# Patient Record
(demographics unavailable — no encounter records)

---

## 2024-11-05 NOTE — ASSESSMENT
[FreeTextEntry1] : . 56 y/o F with HTN, HLD, non-obstructive CAD, HFrEF, CVA?, DM, cirrhosis/fibrosis, obesity, ESRD on HD, Vit D deficiency, GERD, fibromyalgia, ovarian cancer s/p hysterectomy + BL oophorectomy + chemo, presenting for f/u.  # Posterior neck lesion; suspect lipoma - f/u US  # SC lump RUQ; resolved  # nodular thickening of the left adrenal gland - f/u with endo  # HTN; controlled - c/w amlodipine 5mg po daily, carvedilol 3.125mg unclear frequency  # HLD - 7/2024 LDL at goal, TG high- will f/u with endo - c/w atorvastatin 40mg po daily  # non-obstructive CAD # HFrEF - following with OSH cardio; will send in records - c/w carvedilol 3.125 unclear frequency, plavix 75mg po daily, bumex as per cardio  # CVA? # intermittent headaches - past imaging without evidence of CVA - f/u neuro  # DM # obesity - following with endo, c/w mounjaro and insulin regimen as per endo - A1c and lipids as per cardio - UTD  optho, no retinopathy - due podiatry  # cirrhosis/fibrosis - f/u hepatology  # ESRD on HD - c/w current regimen as per nephro - f/u nephro regarding headaches at HD - cbc/cmp with nephro  # Vit D deficiency - c/w vit D3 50K IU weekly to complete course followed by 1000 IU daily  #GERD; controlled - c/w omeprazole 40mg po daily 30 min before breakfast  # Fibromyalgia # polyarthralgia - follows with OSH rheum, c/w current regimen as per rheum - reviewed elevated ESR/CRP - rheum and pain management eval given multiple med allergies  # Ovarian cancer s/p hysterectomy + BL oophorectomy + chemo; in remission - following with OSH GYN, does not recall doctors name  # HCM - mammo  - DEXA - never had scope; GI f/u (scopes cancelled due to hyperkalemia) - flu vaccine today  f/u 4 months.

## 2024-11-05 NOTE — HISTORY OF PRESENT ILLNESS
[de-identified] : 56 y/o F with HTN, HLD, non-obstructive CAD, HFrEF, CVA?, DM, cirrhosis/fibrosis, obesity, ESRD on HD, Vit D deficiency, GERD, fibromyalgia, ovarian cancer s/p hysterectomy + BL oophorectomy + chemo, presenting for f/u. Fibromyalgia and polyarthralgia worse Doing well on Mounjaro + chronic headaches at HD stable + lump on back of neck, US not completed + subcutaneous lump RUQ resolved

## 2024-12-19 NOTE — ASSESSMENT
[FreeTextEntry1] : .  56 y/o F with HTN, HLD, non-obstructive CAD, HFrEF, CVA?, DM, cirrhosis/fibrosis, obesity, ESRD on HD, Vit D deficiency, GERD, fibromyalgia, ovarian cancer s/p hysterectomy + BL oophorectomy + chemo, presenting for pre-op clearance for L cataract surgery.  # HTN; controlled? due for HD tomorrow - c/w amlodipine 5mg po daily, carvedilol 3.125mg unclear frequency  # HLD - 7/2024 LDL at goal, TG high- will f/u with endo - c/w atorvastatin 40mg po daily  # non-obstructive CAD # HFrEF - f/u OSH cardio - c/w carvedilol 3.125 unclear frequency, plavix 75mg po daily, bumex as per cardio  # pre-op clearance - reviewed EKG; medically optimized for cataract procedure  f/u 3 months

## 2024-12-19 NOTE — HISTORY OF PRESENT ILLNESS
[de-identified] : 58 y/o F with HTN, HLD, non-obstructive CAD, HFrEF, CVA?, DM, cirrhosis/fibrosis, obesity, ESRD on HD, Vit D deficiency, GERD, fibromyalgia, ovarian cancer s/p hysterectomy + BL oophorectomy + chemo, presenting for pre-op clearance for L cataract surgery. chronic headaches stable, didnt see neuro yet

## 2025-01-26 NOTE — PHYSICAL EXAM
[Alert] : alert [Well Nourished] : well nourished [Obese] : obese [No Acute Distress] : no acute distress [Well Developed] : well developed [Normal Sclera/Conjunctiva] : normal sclera/conjunctiva [Normal Oropharynx] : the oropharynx was normal [No LAD] : no lymphadenopathy [Supple] : the neck was supple [Thyroid Not Enlarged] : the thyroid was not enlarged [No Thyroid Nodules] : no palpable thyroid nodules [No Respiratory Distress] : no respiratory distress [Normal Rate and Effort] : normal respiratory rate and effort [Clear to Auscultation] : lungs were clear to auscultation bilaterally [Normal S1, S2] : normal S1 and S2 [Normal Rate] : heart rate was normal [Regular Rhythm] : with a regular rhythm [No Edema] : no peripheral edema [Pedal Pulses Normal] : the pedal pulses are present [Normal Bowel Sounds] : normal bowel sounds [Not Tender] : non-tender [Not Distended] : not distended [Soft] : abdomen soft [Normal Anterior Cervical Nodes] : no anterior cervical lymphadenopathy [No Stigmata of Cushings Syndrome] : no stigmata of Cushings Syndrome [Normal Gait] : normal gait [No Clubbing, Cyanosis] : no clubbing  or cyanosis of the fingernails [Normal Strength/Tone] : muscle strength and tone were normal [No Rash] : no rash [Abdominal Striae] : abdominal striae present [Acanthosis Nigricans] : acanthosis nigricans present [No Sensory Deficits] : the sensory exam was normal to light touch and pinprick [Normal Reflexes] : deep tendon reflexes were 2+ and symmetric [No Tremors] : no tremors [Oriented x3] : oriented to person, place, and time [Foot Ulcers] : no foot ulcers [Acne] : no acne [Hirsutism] : no hirsutism [de-identified] : Morbidly obese [de-identified] : Umblical hernia [de-identified] : Skin hyperpigmentation on face

## 2025-01-26 NOTE — HISTORY OF PRESENT ILLNESS
[FreeTextEntry1] : 57 year old Female with PMH of ovarian cancer s/p hystrectomy and oopherectomy, HTN, T2DM, ESRD on HD, morbid obesity came for type 2 diabetes follow up  Diabetes Hx: Diabetes diagnosed for > 20 years Medication regimen: Lantus 22 units at bedtime, Stopped Humalog 4-5 units before meals, Mounjaro 7.5 mg weekly, stopped pregabalin, takes atorvastatin 40 mg daily. Losartan 50 MG daily, vascepa1 gm daily. Patient did get the medications for some reason, now restarted taking medications.  Recent fingersticks: Did not get the freestyle cortes yet. Using manual glucometer Checks sugars ranges from 90. During the day its 140s, denies that BS are >150s.  Hypoglycemic events: Denies Diet: Has changed her diet, only eats spoon of rice, and pasta Opthalmology appointment: had a cataract surgery 6 months ago. Denies retinopathy. Has to make an appointment Peripheral Neuropathy: Yes, takes pregabalin, sees podiatry every 6 months. CVD: Denies Patient is on transplant list for renal transplant, however she was told to lose weight to be eligible for transplant. Patient is interested in Bariatric surgery, however she is at high risk and currently is not eligible for bariatric sx.  Patient is now losing weight after she has switched from ozempic to mounjaro. Has lost 50 lbs after starting GLP-1 agonist. Has an appointment with renal transplant team for reevaluation.

## 2025-01-26 NOTE — PHYSICAL EXAM
[Alert] : alert [Well Nourished] : well nourished [Obese] : obese [No Acute Distress] : no acute distress [Well Developed] : well developed [Normal Sclera/Conjunctiva] : normal sclera/conjunctiva [Normal Oropharynx] : the oropharynx was normal [No LAD] : no lymphadenopathy [Supple] : the neck was supple [Thyroid Not Enlarged] : the thyroid was not enlarged [No Thyroid Nodules] : no palpable thyroid nodules [No Respiratory Distress] : no respiratory distress [Normal Rate and Effort] : normal respiratory rate and effort [Clear to Auscultation] : lungs were clear to auscultation bilaterally [Normal S1, S2] : normal S1 and S2 [Normal Rate] : heart rate was normal [Regular Rhythm] : with a regular rhythm [No Edema] : no peripheral edema [Pedal Pulses Normal] : the pedal pulses are present [Normal Bowel Sounds] : normal bowel sounds [Not Tender] : non-tender [Not Distended] : not distended [Soft] : abdomen soft [Normal Anterior Cervical Nodes] : no anterior cervical lymphadenopathy [No Stigmata of Cushings Syndrome] : no stigmata of Cushings Syndrome [Normal Gait] : normal gait [No Clubbing, Cyanosis] : no clubbing  or cyanosis of the fingernails [Normal Strength/Tone] : muscle strength and tone were normal [No Rash] : no rash [Abdominal Striae] : abdominal striae present [Acanthosis Nigricans] : acanthosis nigricans present [No Sensory Deficits] : the sensory exam was normal to light touch and pinprick [Normal Reflexes] : deep tendon reflexes were 2+ and symmetric [No Tremors] : no tremors [Oriented x3] : oriented to person, place, and time [Foot Ulcers] : no foot ulcers [Acne] : no acne [Hirsutism] : no hirsutism [de-identified] : Morbidly obese [de-identified] : Umblical hernia [de-identified] : Skin hyperpigmentation on face

## 2025-01-26 NOTE — REVIEW OF SYSTEMS
[Fatigue] : fatigue [Pain/Numbness of Digits] : pain/numbness of digits [Recent Weight Gain (___ Lbs)] : no recent weight gain [Blurred Vision] : no blurred vision [Dysphagia] : no dysphagia [Neck Pain] : no neck pain [Chest Pain] : no chest pain [Palpitations] : no palpitations [Lower Ext Edema] : no lower extremity edema [Shortness Of Breath] : no shortness of breath [SOB on Exertion] : no shortness of breath on exertion [Nausea] : no nausea [Constipation] : no constipation [Abdominal Pain] : no abdominal pain [Vomiting] : no vomiting [Diarrhea] : no diarrhea [Polyuria] : no polyuria [Headaches] : no headaches [Dizziness] : no dizziness [Tremors] : no tremors [Polydipsia] : no polydipsia [Cold Intolerance] : no cold intolerance

## 2025-02-13 NOTE — PHYSICAL EXAM
[No Acute Distress] : no acute distress [Sclera Anicteric] : sclera anicteric [Clear to Auscultation] : clear to auscultation [Breathing Comfortably on RA] : breathing comfortably on room air [Normal Rate] : normal rate [Regular Rhythm] : regular rhythm [Soft] : soft [Non-tender] : non-tender [Scars] : scars [In Left Forearm] : fistula/graft in left forearm [] : right dorsalis pedis palpable [Normal] : normal [FreeTextEntry1] : No dentures. missing teeth from chemo [TextBox_25] : Obese. Midline scar. lower suprapubic incisional hernia, No masses [TextBox_34] : Trace edema b/l LE, no ulcers.  [TextBox_86] : No inguinal lymphadenopathy

## 2025-02-13 NOTE — ASSESSMENT
[Fair candidate] : a fair candidate. We should proceed with our protocol for evaluation for kidney transplantation. [FreeTextEntry1] : long-standing DM, ?heart failure in the past; Multiple abdominal surgeries Will need cardiology eval CT chest, abd/pel Hepatology follow-up for fatty liver and inconclusive fibroscan Neuro eval for prior CVA/TIA Routine transplant workup

## 2025-02-13 NOTE — REVIEW OF SYSTEMS
[Fever] : no fever [Sclera anicteric] : sclera anicteric [Wears glasses] : wears glasses [Chest Pain] : no chest pain [SOB] : no shortness of breath [Abdominal Pain] : no abdominal pain [Dysuria] : no dysuria [Hematuria] : no hematuria

## 2025-02-13 NOTE — HISTORY OF PRESENT ILLNESS
[Previous Kidney Transplant] : no previous kidney transplant [Cardiac History] : cardiac history [Blood Transfusion] : prior blood transfusion [Hx of DVT/Thrombosis/Miscarriage] : no history of dvt/thrombosis/miscarriage [Diabetes] : diabetes [Retinopathy] : no retinopathy [Neuropathy] : no neuropathy [Insulin] : insulin treatment [Not Working] : Not working [TextBox_16] : 8/2022 [TextBox_20] : 2022 [TextBox_24] : 3 years [TextBox_28] : 20 years [TextBox_30] : 10 blocks [TextBox_39] : 2015 [FreeTextEntry3] : on disability [de-identified] : 57F with ESRD on HD for evaluation for kidney transplant. Patient has history of ovarian CA 20 years ago, s/p BRITTANY-BSO and 9 cycles of chemotherapy in [facility]. She had CVA/TIA  with occlusion of vertebral artery which reportedly resolved spontaneously in 2022, and then she developed renal failure requiring HD.  Hgb A1c 5.9   HD - [facility] [place] [facility] - No  PMH: HTN, DM, ESRD on HD, Ovarian CA (completed 9 cycles of chemo in 2005/6), ?HFpEF; Fibromyalgia PSH: 18 abdominal surgeries - BRITTANY-BSO 2005; hernia repair x2 with mesh; repair EC fistula with SBR; LUE AVF; ectopic pregnancy; Cataract surgery Meds: Lantus 18u, Mounjaro 10, Atorvastatin, Losartan, Amlodipine, Bumetanide, Carvedilol Allergies: ASA, Ibuprofen, Tylenol Social: Denies tob, etoh, drugs Lives with parents (82,82yo) Has one daughter, 31yo FMH: Paternal aunts with Breast CA No renal disease    ROS:  Cardiac - no MI, CAD. ?HFpEF Pulmonary- no h/o COPD, Asthma Infections- no h/o TB, HIV, Hepatitis.  Heme- no h/o malignancy or bleeding disorders. No DVT/PE Neuro- +CVA x2, no deficit Sensitization events- + previous blood transfusions; No previous transplant No infections or hospitalizations in the last few months  - No UTI or Kidney stones. Makes normal amount of urine with diuretics GI - No melena or blood per rectum. Last colonoscopy was a few years ago Mammogram last week negative; PAP 2 years ago negative

## 2025-02-13 NOTE — REASON FOR VISIT
[Initial] : an initial visit for [End-Stage Renal Disease] : end-stage renal disease [Kidney Transplant Evaluation] : kidney transplant evaluation [FreeTextEntry2] : Dr Pedro

## 2025-02-14 NOTE — PHYSICAL EXAM
[General Appearance - Alert] : alert [General Appearance - In No Acute Distress] : in no acute distress [Sclera] : the sclera and conjunctiva were normal [PERRL With Normal Accommodation] : pupils were equal in size, round, and reactive to light [Extraocular Movements] : extraocular movements were intact [Outer Ear] : the ears and nose were normal in appearance [Oropharynx] : the oropharynx was normal [Neck Appearance] : the appearance of the neck was normal [Neck Cervical Mass (___cm)] : no neck mass was observed [Jugular Venous Distention Increased] : there was no jugular-venous distention [Thyroid Diffuse Enlargement] : the thyroid was not enlarged [Thyroid Nodule] : there were no palpable thyroid nodules [Auscultation Breath Sounds / Voice Sounds] : lungs were clear to auscultation bilaterally [Heart Rate And Rhythm] : heart rate was normal and rhythm regular [Heart Sounds] : normal S1 and S2 [Heart Sounds Gallop] : no gallops [Murmurs] : no murmurs [Heart Sounds Pericardial Friction Rub] : no pericardial rub [Full Pulse] : the pedal pulses are present [Edema] : there was no peripheral edema [Bowel Sounds] : normal bowel sounds [Abdomen Soft] : soft [Abdomen Tenderness] : non-tender [Abdomen Mass (___ Cm)] : no abdominal mass palpated [Skin Color & Pigmentation] : normal skin color and pigmentation [Skin Turgor] : normal skin turgor [] : no rash [Normal] : normal [Deep Tendon Reflexes (DTR)] : deep tendon reflexes were 2+ and symmetric [Sensation] : the sensory exam was normal to light touch and pinprick [No Focal Deficits] : no focal deficits

## 2025-02-14 NOTE — HISTORY OF PRESENT ILLNESS
[TextBox_42] : MALLY KAMINSKI is a 57 year old female who presents for kidney transplant evaluation.  Cause of ESRD : Long standing h/o DM ( for over 20 years)  Nephrologist:  Dr Pedro  on IHD since 2022 . HD - [facility], [facility] - No at present.   PMH includes- HTN DM h/o Ovarian CA over 20 years ago (completed 9 cycles of chemo in 2005/6),  h/o CVA X2  in 2022 ( 1 month apart) with no residual deficits.  In 5/2022 she had a CVA  with right sided weakness and numbness . And in 6/2022 she had a subacute/chronic L hemistroke with chronic headache and left sided numbness and tingling.  h/o ?HFpEF no MI, CAD.  no h/o COPD, Asthma no h/o TB, HIV, Hepatitis. No UTI or Kidney stones. Makes normal amount of urine with diuretics Sensitization events- has had  + previous blood transfusions; No previous transplant  PSH: 18 abdominal surgeries - BRITTANY-BSO 2005; hernia repair x2 with mesh; repair EC fistula with SBR; LUE AVF; ectopic pregnancy; Cataract surgery Meds: Lantus 18u, Mounjaro 10, Atorvastatin, Losartan, Amlodipine, Bumetanide, Carvedilol Allergies: ASA, Ibuprofen, Tylenol Social: Denies tob, etoh, drugs. Lives with parents (82,82yo). Has one daughter, 31yo FMH: no family h/o kidney disease.  Paternal aunts with Breast CA.

## 2025-03-04 NOTE — REASON FOR VISIT
[Other: ____] : [unfilled] [FreeTextEntry1] : born in [facility] and living in the  for more than 15 years she had CVA x 2 (2 years ago, headache and taken to the hospital, had a clot in the neck that passed through, at that time found out her kidneys were failure) one month later, right sided weakness chronic hypertension, dyslipidemia, diabetes mellitus (A1c 5.2%) ovarian cancer (6 laparoscopic surgeries, tumor removal, ectopic pregnancy, ovarian cancer (BRITTANY, chemo treatment 9 cycles in 2005), right eye surgery, hernia surgery, fistula fixed.   DM2 (previously on insulin, now on Mounjouro, diagnosed __ ago) ESRD on HD, [facility], M-W-F left forearm AV fistula has had blood transfusions in [facility].  Has fibromyalgia, osteoporosis, obesity BMI 45.  Her parents are alive, 82 and 83.  She has one brother and three sisters.  She has one daughter, age 32.  She is not working, she quit after her renal disease. Previously she worked as an  at a clothes department.   She does not exercise. She can walk 4 blocks.  She has a cardiologist.

## 2025-03-04 NOTE — REASON FOR VISIT
[Other: ____] : [unfilled] [FreeTextEntry1] : born in [facility] and living in the  for more than 15 years she had CVA x 2 (2 years ago, headache and taken to the hospital, had a clot in the neck that passed through, at that time found out her kidneys were failure) one month later, right sided weakness chronic hypertension, dyslipidemia, diabetes mellitus (A1c 5.2%) ovarian cancer (6 laparoscopic surgeries, tumor removal, ectopic pregnancy, ovarian cancer (BRITTANY, chemo treatment 9 cycles in 2005), right eye surgery, hernia surgery, fistula fixed.   DM2 (previously on insulin, now on Mounjouro, diagnosed __ ago) ESRD on HD, [facility], M-W-F left forearm AV fistula has had blood transfusions in [facility].  Has fibromyalgia, osteoporosis, obesity BMI 45.  Her parents are alive, 82 and 83.  She has one brother and three sisters.  She has one daughter, age 32.  She is not working, she quit after her renal disease. Previously she worked as an  at a clothes department.   She does not exercise. She can walk 4 blocks.  She has a cardiologist.

## 2025-03-04 NOTE — REASON FOR VISIT
[Other: ____] : [unfilled] [FreeTextEntry1] : born in [facility] and living in the  for more than 15 years she had CVA x 2 (2 years ago, headache and taken to the hospital, had a clot in the neck that passed through, at that time found out her kidneys were failure) one month later, right sided weakness chronic hypertension, dyslipidemia, diabetes mellitus (A1c 5.2%) ovarian cancer (6 laparoscopic surgeries, tumor removal, ectopic pregnancy, ovarian cancer (BRITTANY, chemo treatment 9 cycles in 2005), right eye surgery, hernia surgery, fistula fixed.   DM2 (previously on insulin, now on Mounjouro, diagnosed __ ago) ESRD on HD, [facility] on [facility], M-W-F left forearm AV fistula has had blood transfusions in [facility].  Has fibromyalgia, osteoporosis, obesity BMI 45.  Her parents are alive, 82 and 83.  She has one brother and three sisters.  She has one daughter, age 32.  She is not working, she quit after her renal disease. Previously she worked as an  at a clothes department.   She does not exercise. She can walk 4 blocks.  She has a cardiologist.

## 2025-03-08 NOTE — DISCUSSION/SUMMARY
[EKG obtained to assist in diagnosis and management of assessed problem(s)] : EKG obtained to assist in diagnosis and management of assessed problem(s) [FreeTextEntry1] : 57-year-old who presents today for pretransplant cardiac evaluation prior to potential renal transplant with complex history as above.   She will schedule an echocardiogram for structural heart evaluation.  A nuclear stress test will evaluate for any evidence of inducible ischemia.   Follow up pending results.

## 2025-03-08 NOTE — HISTORY OF PRESENT ILLNESS
[FreeTextEntry1] : Eva Corona presents today for pretransplant cardiac evaluation prior to potential renal transplant.   She is a 57-year-old, born in [facility] and living in the  for more than 15 years, with known cardiac risk factors of prior CVA x 2 (2022, one month apart, 5/2022 CVA with right sided weakness and numbness and 6/2022 subacute/chronic left hemistroke with chronic headache and left sided numbness and tingling) chronic hypertension, dyslipidemia, diabetes mellitus (A1c 5.2%) and ESRD (on hemodialysis since 2022, M-W-F, [facility]).   She has a history of ovarian cancer (treated with 9 cycles of chemotherapy, 2005). She also has fibromyalgia, osteoporosis, obesity with BMI 45.  Surgical history includes 6 laparoscopic surgeries, tumor removal, ectopic pregnancy, BRITTANY-BSO, right eye surgery, hernia surgery with mesh, and enterocutaneous fistula repair, AV fistula creation.    Her parents are alive, 82 and 83. She has one brother and three sisters.  She has one daughter, age 32.  She is not working, she quit after her renal disease. Previously she worked as an  at a clothing department.   She does not exercise. She can walk 4 blocks and climb stair slowly. No chest discomfort, shortness of breath, palpitations, lightheadedness or syncope.

## 2025-03-08 NOTE — END OF VISIT
[FreeTextEntry3] : I, Carrillo Ellsworth MD, personally performed the evaluation and management (E/M) services for this new patient. That E/M includes conducting the clinically appropriate initial history &/or exam, assessing all conditions, and establishing the plan of care. Today, Cleopatra Jaramillo NP was here to observe my evaluation and management service for this patient & follow plan of care established by me going forward. [Time Spent: ___ minutes] : I have spent [unfilled] minutes of time on the encounter which excludes teaching and separately reported services.

## 2025-03-08 NOTE — HISTORY OF PRESENT ILLNESS
[FreeTextEntry1] : Eva Corona presents today for pretransplant cardiac evaluation prior to potential renal transplant.   She is a 57-year-old, born in [facility] and living in the  for more than 15 years, with known cardiac risk factors of prior CVA x 2 (2022, one month apart, 5/2022 CVA with right sided weakness and numbness and 6/2022 subacute/chronic left hemistroke with chronic headache and left sided numbness and tingling) chronic hypertension, dyslipidemia, diabetes mellitus (A1c 5.2%) and ESRD (on hemodialysis since 2022, M-W-F, [facility] on [facility]).   She has a history of ovarian cancer (treated with 9 cycles of chemotherapy, 2005). She also has fibromyalgia, osteoporosis, obesity with BMI 45.  Surgical history includes 6 laparoscopic surgeries, tumor removal, ectopic pregnancy, BRITTANY-BSO, right eye surgery, hernia surgery with mesh, and enterocutaneous fistula repair, AV fistula creation.    Her parents are alive, 82 and 83. She has one brother and three sisters.  She has one daughter, age 32.  She is not working, she quit after her renal disease. Previously she worked as an  at a clothing department.   She does not exercise. She can walk 4 blocks and climb stair slowly. No chest discomfort, shortness of breath, palpitations, lightheadedness or syncope.

## 2025-03-08 NOTE — CARDIOLOGY SUMMARY
[de-identified] : 3/4/2025 - sinus rhythm at 80 bpm, nonspecific T-abnormality, poor R-wave progression

## 2025-03-08 NOTE — CARDIOLOGY SUMMARY
[de-identified] : 3/4/2025 - sinus rhythm at 80 bpm, nonspecific T-abnormality, poor R-wave progression

## 2025-03-08 NOTE — CARDIOLOGY SUMMARY
[de-identified] : 3/4/2025 - sinus rhythm at 80 bpm, nonspecific T-abnormality, poor R-wave progression

## 2025-03-08 NOTE — HISTORY OF PRESENT ILLNESS
[FreeTextEntry1] : Eva Corona presents today for pretransplant cardiac evaluation prior to potential renal transplant.   She is a 57-year-old, born in [facility] and living in the  for more than 15 years, with known cardiac risk factors of prior CVA x 2 (2022, one month apart, 5/2022 CVA with right sided weakness and numbness and 6/2022 subacute/chronic left hemistroke with chronic headache and left sided numbness and tingling) chronic hypertension, dyslipidemia, diabetes mellitus (A1c 5.2%) and ESRD (on hemodialysis since 2022, M-W-F, [facility] on [facility]).   She has a history of ovarian cancer (treated with 9 cycles of chemotherapy, 2005). She also has fibromyalgia, osteoporosis, obesity with BMI 45.  Surgical history includes 6 laparoscopic surgeries, tumor removal, ectopic pregnancy, BRITTANY-BSO, right eye surgery, hernia surgery with mesh, and enterocutaneous fistula repair, AV fistula creation.    Her parents are alive, 82 and 83. She has one brother and three sisters.  She has one daughter, age 32.  She is not working, she quit after her renal disease. Previously she worked as an  at a clothing department.   She does not exercise. She can walk 4 blocks and climb stair slowly. No chest discomfort, shortness of breath, palpitations, lightheadedness or syncope.

## 2025-03-26 NOTE — HISTORY OF PRESENT ILLNESS
[FreeTextEntry1] : Mar 26 2025  4:00PM --. check in 4.26  Underlying Disease(s) Requiring Transplant:  Long standing h/o DM ( for over 20 years) on IHD since 2022. HD - [facility] - No at present. --------------------------------------------------------------------------------- Ms. MALLY KAMINSKI  is a 57 year  year-old F   undergoing evaluation for transplantation. Infectious diseases (ID) has been consulted for assessment of infection risks and to render an opinion as to whether or not they are a suitable candidate for transplantation from the infectious diseases standpoint and for positive Syphilis serology and HIV serology being indeterminate  Makes normal amount of urine with diuretics but on HD through AVF  PSH: 18 abdominal surgeries - BRITTANY-BSO 2005; hernia repair x2 with mesh; repair EC fistula with SBR; LUE AVF h/o Ovarian CA over 20 years ago (completed 9 cycles of chemo in 2005/6), h/o CVA X2 in 2022 ( 1 month apart) with no residual deficits. In 5/2022 she had a CVA with right sided weakness and numbness. And in 6/2022 she had a subacute/chronic L hemistroke with chronic headache and left sided numbness   Has not been sexually active sicne her ovarian cancer She has enver been treated for syphilis and denies vaginal ulcers or discharge, no other known or treated STD- no transfusions recently She states she received "purified liver transfusions" In [facility] for low iron   -------------------------------------------------------- Past/current ID issues (taken from patient report and records)  history of hospitalization due to infection: NO History of bacteremia, sepsis:NO History of endocarditis: NO History of MDRO: NO History of pneumonia:NO History of Flu/COVID 19: NO History of recurrent UTIs:NO History of infectious diarrhea, Cdiff: NO History of dental infection: NO History of meningitis:NO History of sinusitis:NO Childhood history of recurrent pharyngitis/strep throat; ear infections, sinusitis: NO History of HPV/high grade dysplasia: v History of STD: yes History of Shingles: NO History of VV Candidiasis:NO History of FUO:  History of OM, skin soft tissue infections: hadankle cellulitis vs soft tissue/? infection at [facility] 1 year ago -- History of tonsillectomy/appendectomy/splenectomy: History of metal hardware (e.g:PPM/defibrillator, prosthesis, Pins/needles): ---------------------------------------------------------------------------------------------------------------------------------- Childhood illnesses Chickenpox: yes  Measles, Rubella, Scarlet fever, RH fever: NO  --------------------------------------------------------------------------------------------------------------------------------- Exposure/Travel History:    Current Residence (Born and raised): Born in [place] [facility] Lived in other states:  Travel within  (including [facility] or [facility]): no Foreign travel history:   no 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 Never been homeless.   work in Healthcare setting:NO ---------------------------------------------------------------------------------------------------------------------------- Animal Exposure history: 	Domestic :  has a perroket 	Wildlife : 	Livestock animals: 	Birds :NO 	Fishes :NO 	other:NO  ----------------------------------------------------------------------------------------------------------------------------- Dietary Habits:  Reviewed risks of consuming and advised to avoid: 	Raw animal or dairy products -unpasteurized milk /cheese. 	Raw seafood -sushi 	Raw eggs - 	Raw or undercooked meat/poultry -   	Unpasteurized milk products  -   	Home made sausage - no      Unwashed vegetables -  Reviewed food safety as it relates to infection risks in the setting of transplantation in the immunocompromised host.   Reviewed CDC guidelines for the prevention of Listeria in the immunocompromised host.      Environment: 	Residence water supply: city/island 	Reviewed of occupational and recreational exposure risks as they relate to infection in the setting of transplantation.  --------------------------------------------------------------------- Prior or current immunosuppressive therapies:  ---------------------------------------------------------------------- Immunization History: ----- Childhood immunizations:    ----- Vaccine:                     Y/N;  Date vaccine administered   	Tdap/Td            	Prevnar		 	Pneumovax 	 	Hepatitis A	 	Hepatitis B	 	Influenza	 	Shingles             	HPV                    	COVID 19 	Other	  Vaccines: live vaccines are generally avoided in the transplant recipient following transplantation.  Live vaccines are permitted up to one month prior to transplant.    -------------------------------------------------------------------------------------------------------------------------------------------------- ROS GENERAL: denies chills, , night sweats, weight loss.  PSYCH: denies depression, anxiety, suicidal ideation, hallucination, and delusions SKIN: no rash or lesions; no color changes, no abnormal nevi,no  dryness, and no jaundice   EYES: denies visual changes, floaters, pain, inflammation, blurred vision, and discharge ENT: denies tinnitus, vertigo, epistaxis, oral lesion, and decreased acuity PULM: denies, hemoptysis, pleurisy CVS: denies angina, palpitations,+ orthopnea, no syncope, or heart murmur GI: denies constipation, diarrhea, melena, abdominal pain, nausea.  : denies dysuria, frequency, discharge, incontinence, stones or macroscopic hematuria MS: no arthralgias, no erythema or swelling, no myalgias, no edema, or lower back pain.  CNS: denies numbness, dizziness, seizure, or tremor ENDO: denies heat/cold intolerance, polyuria, polydipsia, malaise.   HEME: denies bruising, bleeding, lymphadenopathy, anemia, and calf pain --------------------------------------------------------------------------------------------------------------------------------------------------------- Physical Exam:  General: AOx3, NAD HEENT: NCAT, normal conjunctiva with no icterus or erythema, no oropharyngeal abnormalities, no abscess and good oral hygiene  Neck: ROM normal, supple, no meningismus, no lymphadenopathy  Cardiovascular: regular rhythm;  no murmurs, rubs, no S3, S4; palpable pedal pulses Respiratory:  no wheezes, rales or rhonchi,, normal airway entry bilaterally Abdominal:  non distended,  abdomen soft, non-tender,  with no fluid shift. No Hepatosplenomegaly.  Genitourinary: no CVA, bladder tenderness Neurological: AOx3, NAD, interactive and appropriate, spontaneously moves all extremities, CN grossly intact, no focal deficits appreciated Musculoskeletal/extremities: Strength symmetric,  no lower extremity edema  Skin: Warm, dry, no rashes, nodules, wounds or other lesions

## 2025-03-26 NOTE — ASSESSMENT
[FreeTextEntry1] : A. ACTIVE INFECTIOUS DISEASES ISSUES 1 Indeterminate HIV1/2 Ab/Ag, HIV 1 Vl neg- suspect false positive will still check again Ab/Ag and VL for 1/2 HIV 2. 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 would favor baseline Ct chest prior to tx if not done  ----------------- B. PRE-TRANSPLANT EVALUTION AND PREVENTIVE CARE ------- - No current contraindication from the Infectious Diseases standpoint to proceed with listing/transplantation - Prior to activation, recommend : ------- 1. Routine and additional ID screening Please send, if not done yet and/or follow: Reviewed available serologies  --. adding T cruzi serologies as she is from endemic area - HIV Ab/Ag  - HSV 1/2 IgG - VZV IgG - EBV Serology panel (or VCA IgG) - CMV IgG - Hepatitis A IgG - Hepatitis B surface IgG - Hepatitis B core IgG - Hepatitis B surface Antigen - Hepatitis C Antibody - Measles (Rubeola) IgG - Rubella IgG ([place] Measles) - Mumps IgG - Syphilis screening (antitreponemal antibody with reflex to RPR) - Quantiferon Gold  -Toxoplasma IgG - Strongyloides IgG - Trypanozoma Cruzi IgG (Chagas Disease)  ----- 2. Immunizations  -Overall, vaccinations are recommended ideally in the pre-transplant period, at least 14 days prior to transplantation.  -If transplantation is felt imminent, then immunizations should be deferred to the post-transplant period, at least 4-6 months post-transplant with the exception of COVID-19 vaccination and the flu vaccine, which can be started as early as 1 month post-transplant. Similarly, all incomplete schedules prior to transplantation should be deferred to 4-6 months post-transplant.  - No live vaccinations should be administered unless transplantation is not anticipated or planned within the 4 weeks following vaccine administration. - If no plan to transplant in the next 4 weeks, could administer MMR if the patient is non immune to either  Measles, Rubeola, Mumps   COVID19:  Influenza:  Pneumococcal:  HAV:  HBV:  MMR:  Varicella:  Shingles:   Tdap:  HPV:   Recommend administration of: - T=recuieved flu vaccine RSV for next season, to give in automn - Heplislav needs 2 doses, first today, next 1 month from now - Tdap  (tetanus, diphteria, pertussis): once, with follow up Td every 10 years needed - Prevnar 21 (pneumococcal vaccine): once with no additional pneumococcal vaccine needed tpday - Shingrix (Herpes Zoster): Will need a second dose 2 to 6 months later if not transplanted at the time of due vaccine.   3. PERIOPERATIVE AND POST-TRANSPLANT PROPHYLAXIS  Perioperative ANTIBACTERIAL prophylaxis: - No history of MDRO: prophylaxis per protocol t is recommended.  RTc in 1 and 2 weeks for injection, in 1 month for vaccines

## 2025-04-08 NOTE — ASSESSMENT
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 Potassium level mild elevated.  Repat CMP to check potassium level Endoscopy and colonoscopy:  Sutab bowel prep Colon Cancer screening: The patient was told of the risks and benefits of the procedure. The patient was told of the risks of perforation, emergency surgery, bleeding, infections and missed lesions. The patient is to be on a clear liquid diet the entire day prior to the procedure. The patient is to complete the entire prescribed bowel prep the day prior to the procedure as directed. The patient is told not to drive, drink alcohol, use recreational drugs, exercise, or work the day of the procedure. The patient was told of the need for an escort to accompany the patient home after the procedure. The patient is aware that the procedure may be cancelled if she fails to follow the directions. The patient agreed and will schedule for the procedure. The patient can take the antihypertensive medication with a sip of water one hour prior to the procedure. The patient is to hold the blood thinner medication or NSAIDS for 7 days prior to the procedure. The patient is to be NPO after midnight and bowel prep was given. The patient is to return for the procedure.

## 2025-04-08 NOTE — HISTORY OF PRESENT ILLNESS
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  She underwent EGD/colonoscopy 7-8 years ago. She was scheduled for the procedures in June, procedures cancelled due to Hyperkalemia.   Family history of colon cancer: no BM/D: 1-2 BM daily, no diarrhea Blood/mucus: no Change in bowel habits: no Weight changes: lost due to diet and medication (mounjaro)  Dysphagia: no Odynophagia: no Hematemesis - no Gags easily GERD sx: yes, PPI as needed, last time used was 2 months ago Smoking: no NSAID use: no CBC: H/H 12.3/38.0 with MCV 92.7 [de-identified] : US with elastography results corresponds to Metavir score F2-F3.

## 2025-04-24 NOTE — HISTORY OF PRESENT ILLNESS
[FreeTextEntry1] : 57 obese F wtih ESRD on HD (M/W/F, via L forearm AVF), Recent Stroke (with residual R sided weakness and numbness, hospitalized in [facility] in the second week of May 2023), DM2, HTN, Fibromyalgia, and Ovarian Cancer (s/p BRITTANY-BSO and 9 cycles of chemotherapy 12 years ago).  Noted to have subacute/chronic L hemisphere stroke, likely small vessel in 2023.  Patient's symptoms improved except occasional right sided weakness. On Plavix and atorvastatin 40mg for secondary stroke prevention  Patient is here for follow-up, she is on the list for kidney transplant.

## 2025-04-24 NOTE — ASSESSMENT
[FreeTextEntry1] : Patient has not 2 strokes in 2023 presenting with 5 right-sided numbness and tingling.  Will obtain an MRI of the brain, MRA of the head and neck to evaluate for strokes as well as any new events and intracranial stenosis.  Will also check LDL hemoglobin A1c levels to see if the patient is on goal for secondary stroke prevention.  For now, we will plan to continue with Plavix and atorvastatin 40mg for secondary stroke prevention, patient is allergic to aspirin.  Blood pressure goal as that is normal.  I spent the time noted on the day of this patient encounter preparing for, providing and documenting the above E/M service and counseling and educate patient on differential, workup, disease course, and treatment/management. Education was provided to the patient during this encounter. All questions and concerns were answered and addressed in detail. The patient verbalized understanding and agreed to plan. Patient was advised to continue to monitor for neurologic symptoms and to notify my office or go to the nearest emergency room if there are any changes. Any orders/referrals and communications were provided as well.   Side effects of the above medications were discussed in detail including but not limited to applicable black box warning and teratogenicity as appropriate.  For patients with seizures, I discussed risk of SUDEP with patient and advised that SUDEP risk can be minimized with good seizure control through medication compliance and other measures. Patient was advised to bring previous records to my office, including CD of imaging, when applicable.

## 2025-04-24 NOTE — CONSULT LETTER
[Dear  ___] : Dear  [unfilled], [Consult Letter:] : I had the pleasure of evaluating your patient, [unfilled]. [Please see my note below.] : Please see my note below. [Consult Closing:] : Thank you very much for allowing me to participate in the care of this patient.  If you have any questions, please do not hesitate to contact me. [Sincerely,] : Sincerely, [FreeTextEntry3] : Rossy Simon MD, MPH

## 2025-05-06 NOTE — HISTORY OF PRESENT ILLNESS
[de-identified] : 56 y/o F with HTN, HLD, non-obstructive CAD, HFrEF, CVA?, DM, cirrhosis/fibrosis, obesity, ESRD on HD, Vit D deficiency, osteopenia, GERD, fibromyalgia, ovarian cancer s/p hysterectomy + BL oophorectomy + chemo, presenting for f/u. Accompanied by daughter. Fibromyalgia and polyarthralgia getting better with pool therapy + chronic headaches at HD---> still intermittent numbness/tingling in face and tongue. Denies other focal deficits. Seen by neuro with imaging pending Otherwise feeling well.

## 2025-05-06 NOTE — ASSESSMENT
[FreeTextEntry1] : . 58 y/o F with HTN, HLD, non-obstructive CAD, HFrEF, CVA?, DM, cirrhosis/fibrosis, obesity, ESRD on HD, Vit D deficiency, osteopenia, GERD, fibromyalgia, ovarian cancer s/p hysterectomy + BL oophorectomy + chemo, presenting for f/u.  # Posterior neck lipoma; stable - doesn't bother her, declines surgery eval  # nodular thickening of the left adrenal gland - f/u with endo  # HTN; controlled - c/w amlodipine 10mg po daily, bumex on non-HD days, carvedilol 3.125mg po BID  # HLD - c/w atorvastatin 40mg po daily and vascepa  # non-obstructive CAD # HFrEF - following with OSH cardio; will send in records - c/w carvedilol 3.125 po BID, plavix 75mg po daily, bumex as per cardio - noted recent positive syphilis screen, now s/p tx with ID  # CVA? # intermittent headaches - episodic numbness/tingling stable - neuro w/u pending - again discussed need for emergency eval if any acute changes/symptoms  # DM # obesity - 2/2025 a1c 5.3 - following with endo, c/w mounjaro and insulin regimen as per endo - UTD optho, no retinopathy - due podiatry  # cirrhosis/fibrosis - f/u hepatology  # ESRD on HD - c/w current regimen as per nephro - f/u nephro regarding headaches at HD - cbc/cmp with nephro  # Vit D deficiency # osteopenia - goal 1200mg calcium daily - 2/2025 vit D 44; c/w D3 1000 IU daily - weight bearing exercise as tolerated; PT - fall precautions  # GERD; controlled - off PPI, feeling well  # Fibromyalgia # polyarthralgia - doing better with water PT - follows with OSH rheum, c/w current regimen as per rheum - rheum and pain management eval given multiple med allergies  # Ovarian cancer s/p hysterectomy + BL oophorectomy + chemo; in remission - following with OSH GYN, does not recall doctors name  # Plantar fasciitis- resolved - reviewed exercises, avoid NSAIDs  # HCM - mammo 1/2025 BIRADS-1 - never had scope; EGD/colonoscopy scheduled - previous mood changes resolved  f/u 3 months.

## 2025-05-19 NOTE — HISTORY OF PRESENT ILLNESS
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 She underwent EGD/colonoscopy 7-8 years ago. She was scheduled for the procedures in June, procedures cancelled due to Hyperkalemia.Not anemic on most recent CBC Feb 2025.

## 2025-05-19 NOTE — ASSESSMENT
[FreeTextEntry1] : 57-year-old female history of obesity, diabetes, end-stage renal disease MWF (not currently on donor list due to weight), hepatomegaly, hyperlipidemia, stroke (x2, 2022, no residual weakness), benign ovarian tumor status post right ovary removal in the 90s, ovarian cancer status post BRITTANY and left ovarian removal and chemotherapy '94, complicated by fistula), here for re-transplant colon cancer screening and EGD to eval for varices in light of hepatomegaly.

## 2025-06-24 NOTE — HISTORY OF PRESENT ILLNESS
[FreeTextEntry1] : 57 obese F wtih ESRD on HD (M/W/F, via L forearm AVF), Recent Stroke (with residual R sided weakness and numbness, hospitalized in [facility] in the second week of May 2023), DM2, HTN, Fibromyalgia, and Ovarian Cancer (s/p BRITTANY-BSO and 9 cycles of chemotherapy 12 years ago). Noted to have subacute/chronic L hemisphere stroke, likely small vessel in 2023.  Patient's symptoms improved except occasional right sided weakness. On Plavix and atorvastatin 40mg for secondary stroke prevention  Patient is here for follow-up, she is on the list for kidney transplant. No new symptoms since last visit.

## 2025-06-24 NOTE — ASSESSMENT
[FreeTextEntry1] : Patient has 2 reported strokes in 2023 presenting with right-sided numbness and tingling, cw TIA Brain MRI: No acute infarct. Brain MRA: No large vessel occlusion or aneurysm. Neck MRA: No hemodynamically significant stenosis of the bilateral cervical ICAs by NASCET criteria. LDL 80, hemoglobin A1c 5.6 cw Plavix and atorvastatin 40mg for secondary stroke prevention as the patient did not have stroke, patient is allergic to aspirin.  Blood pressure goal as that is normal.  I spent the time noted on the day of this patient encounter preparing for, providing and documenting the above E/M service and counseling and educate patient on differential, workup, disease course, and treatment/management. Education was provided to the patient during this encounter. All questions and concerns were answered and addressed in detail. The patient verbalized understanding and agreed to plan. Patient was advised to continue to monitor for neurologic symptoms and to notify my office or go to the nearest emergency room if there are any changes. Any orders/referrals and communications were provided as well.  Side effects of the above medications were discussed in detail including but not limited to applicable black box warning and teratogenicity as appropriate. For patients with seizures, I discussed risk of SUDEP with patient and advised that SUDEP risk can be minimized with good seizure control through medication compliance and other measures. Patient was advised to bring previous records to my office, including CD of imaging, when applicable.

## 2025-06-24 NOTE — DATA REVIEWED
[de-identified] : EXAM: [id number] - MR BRAIN  - ORDERED BY: PHILIP MUÑOZ  EXAM: [id number] - MR ANGIO NECK  - ORDERED BY: PHILIP MUÑOZ  EXAM: [id number] - MR ANGIO BRAIN  - ORDERED BY: PHILIP MUÑOZ   PROCEDURE DATE:  06/02/2025    INTERPRETATION:  CLINICAL INFORMATION: h/o cva;. OQM. Ordering Dxs: I63.9 Cerebral infarction, unspecified.  TECHNIQUE: Multiplanar, multisequence brain MRI without contrast. MRA head without contrast. 3-D reformats were obtained. MRA neck without contrast. 3-D reformats were obtained.  CONTRAST: NONE: 0 cc administered, 0 cc discarded.  COMPARISON: CT head 8/6/2022.  FINDINGS:  Brain MRI:  There is no evidence of acute infarct. There are no foci of susceptibility artifact to suggest hemorrhage. Scattered foci of T2/FLAIR hyperintensity in the bilateral cerebral hemispheric white matter are nonspecific but likely related to chronic white matter microvascular ischemic disease. The ventricles are normal in size for patient's age.  Flow voids of the major intracranial vessels at the skull base follow expected course and contour.  The paranasal sinuses demonstrate no signal abnormality. The mastoids demonstrate no signal abnormality.  Status post bilateral intraocular lens implants.  Brain MRA:  There is flow-related signal in the bilateral intradural internal carotid, anterior cerebral and middle cerebral arteries.  There is flow-related signal in the bilateral intradural vertebral, basilar and posterior cerebral arteries.  No evidence of aneurysm. Tiny aneurysms can be beyond the resolution of MRA technique. No evidence of arteriovenous malformation.  Neck MRA:  There is flow-related signal in the bilateral common carotid and cervical internal carotid arteries. No hemodynamically significant stenosis of the bilateral cervical ICAs by NASCET criteria.  There is flow-related signal in the bilateral vertebral arteries.   IMPRESSION:  Brain MRI: No acute infarct.  Brain MRA: No large vessel occlusion or aneurysm.  Neck MRA: No hemodynamically significant stenosis of the bilateral cervical ICAs by NASCET criteria.  --- End of Report ---       ABEBA JERRY MD; Attending Radiologist This document has been electronically signed. Markie  3 2025  2:49PM

## 2025-06-30 NOTE — PHYSICAL EXAM
[Alert] : alert [Well Nourished] : well nourished [Obese] : obese [No Acute Distress] : no acute distress [Well Developed] : well developed [Normal Sclera/Conjunctiva] : normal sclera/conjunctiva [Normal Oropharynx] : the oropharynx was normal [No LAD] : no lymphadenopathy [Supple] : the neck was supple [Thyroid Not Enlarged] : the thyroid was not enlarged [No Thyroid Nodules] : no palpable thyroid nodules [No Respiratory Distress] : no respiratory distress [Normal Rate and Effort] : normal respiratory rate and effort [Clear to Auscultation] : lungs were clear to auscultation bilaterally [Normal S1, S2] : normal S1 and S2 [Normal Rate] : heart rate was normal [Regular Rhythm] : with a regular rhythm [No Edema] : no peripheral edema [Pedal Pulses Normal] : the pedal pulses are present [Normal Bowel Sounds] : normal bowel sounds [Not Tender] : non-tender [Not Distended] : not distended [Soft] : abdomen soft [Normal Anterior Cervical Nodes] : no anterior cervical lymphadenopathy [No Stigmata of Cushings Syndrome] : no stigmata of Cushings Syndrome [Normal Gait] : normal gait [No Clubbing, Cyanosis] : no clubbing  or cyanosis of the fingernails [Normal Strength/Tone] : muscle strength and tone were normal [No Rash] : no rash [Abdominal Striae] : abdominal striae present [Acanthosis Nigricans] : acanthosis nigricans present [No Sensory Deficits] : the sensory exam was normal to light touch and pinprick [Normal Reflexes] : deep tendon reflexes were 2+ and symmetric [No Tremors] : no tremors [Oriented x3] : oriented to person, place, and time [Foot Ulcers] : no foot ulcers [Acne] : no acne [Hirsutism] : no hirsutism [de-identified] : Umblical hernia [de-identified] : Skin hyperpigmentation on face

## 2025-06-30 NOTE — PHYSICAL EXAM
[Alert] : alert [Well Nourished] : well nourished [Obese] : obese [No Acute Distress] : no acute distress [Well Developed] : well developed [Normal Sclera/Conjunctiva] : normal sclera/conjunctiva [Normal Oropharynx] : the oropharynx was normal [No LAD] : no lymphadenopathy [Supple] : the neck was supple [Thyroid Not Enlarged] : the thyroid was not enlarged [No Thyroid Nodules] : no palpable thyroid nodules [No Respiratory Distress] : no respiratory distress [Normal Rate and Effort] : normal respiratory rate and effort [Clear to Auscultation] : lungs were clear to auscultation bilaterally [Normal S1, S2] : normal S1 and S2 [Normal Rate] : heart rate was normal [Regular Rhythm] : with a regular rhythm [No Edema] : no peripheral edema [Pedal Pulses Normal] : the pedal pulses are present [Normal Bowel Sounds] : normal bowel sounds [Not Tender] : non-tender [Not Distended] : not distended [Soft] : abdomen soft [Normal Anterior Cervical Nodes] : no anterior cervical lymphadenopathy [No Stigmata of Cushings Syndrome] : no stigmata of Cushings Syndrome [Normal Gait] : normal gait [No Clubbing, Cyanosis] : no clubbing  or cyanosis of the fingernails [Normal Strength/Tone] : muscle strength and tone were normal [No Rash] : no rash [Abdominal Striae] : abdominal striae present [Acanthosis Nigricans] : acanthosis nigricans present [No Sensory Deficits] : the sensory exam was normal to light touch and pinprick [Normal Reflexes] : deep tendon reflexes were 2+ and symmetric [No Tremors] : no tremors [Oriented x3] : oriented to person, place, and time [Foot Ulcers] : no foot ulcers [Acne] : no acne [Hirsutism] : no hirsutism [de-identified] : Umblical hernia [de-identified] : Skin hyperpigmentation on face

## 2025-06-30 NOTE — HISTORY OF PRESENT ILLNESS
[FreeTextEntry1] : 57 year old Female with PMH of ovarian cancer s/p hystrectomy and oopherectomy, HTN, T2DM, ESRD on HD, morbid obesity came for type 2 diabetes follow up  Diabetes Hx: Diabetes diagnosed for > 20 years Medication regimen: Stopped Lantus and Humalog 4-5 units before meals, Mounjaro 10 mg weekly, stopped pregabalin, takes atorvastatin 40 mg daily. Losartan 50 MG daily, vascepa1 gm daily.  Recent fingersticks: Did not get the freestyle cortes yet. Using manual glucometer Checks sugars ranges from 90. During the day its 140s, denies that BS are >150s. Hypoglycemic events: Denies Diet: Has changed her diet, only eats spoon of rice, and pasta Opthalmology appointment: had a cataract surgery 6 months ago. Denies retinopathy. Has to make an appointment Peripheral Neuropathy: Yes, takes pregabalin, sees podiatry every 6 months. CVD: Denies Patient is on transplant list for renal transplant, however she was told to lose weight to be eligible for transplant. Patient is interested in Bariatric surgery, however she is at high risk and currently is not eligible for bariatric sx. Patient is now losing weight after she has switched from ozempic to mounjaro. Has lost 50 lbs after starting GLP-1 agonist. Following up with renal transplant team

## 2025-06-30 NOTE — ASSESSMENT
[FreeTextEntry1] : HBA1C is 5.6, may be false value due to hx of ESRD,  Reported BS and in office BS are better controlled. Patient has lost weight on Mounjaro.  PLAN: Continue to hold lantus and humalog - Continue Mounjaro 10 mg weekly- Losing weight appropriately - Continue Lipitor 40 mg daily - Uptodate with opthalmologist and podiatry - Medication compliance re-emphasized. Labs before the next visit  BP High in office, patient has stopped taking amlodipine Recommend restart amlodipine 10 mg daily RTC in Dec 2025.